# Patient Record
Sex: FEMALE | Race: WHITE | Employment: FULL TIME | URBAN - METROPOLITAN AREA
[De-identification: names, ages, dates, MRNs, and addresses within clinical notes are randomized per-mention and may not be internally consistent; named-entity substitution may affect disease eponyms.]

---

## 2022-08-10 ENCOUNTER — TELEPHONE (OUTPATIENT)
Dept: BEHAVIORAL HEALTH | Facility: CLINIC | Age: 52
End: 2022-08-10

## 2022-08-11 ENCOUNTER — TELEPHONE (OUTPATIENT)
Dept: BEHAVIORAL HEALTH | Facility: CLINIC | Age: 52
End: 2022-08-11

## 2022-08-15 ENCOUNTER — HOSPITAL ENCOUNTER (OUTPATIENT)
Dept: BEHAVIORAL HEALTH | Facility: CLINIC | Age: 52
Discharge: HOME | End: 2022-08-15
Attending: FAMILY MEDICINE | Admitting: FAMILY MEDICINE
Payer: COMMERCIAL

## 2022-08-15 VITALS — WEIGHT: 163 LBS | HEIGHT: 67 IN | BODY MASS INDEX: 25.58 KG/M2

## 2022-08-15 PROCEDURE — H0001 ALCOHOL AND/OR DRUG ASSESS: HCPCS | Mod: GT,95

## 2022-08-15 RX ORDER — GABAPENTIN 300 MG/1
400 CAPSULE ORAL
COMMUNITY
Start: 2022-08-09

## 2022-08-15 ASSESSMENT — PAIN SCALES - GENERAL: PAINLEVEL: NO PAIN (0)

## 2022-08-15 ASSESSMENT — COLUMBIA-SUICIDE SEVERITY RATING SCALE - C-SSRS
6. HAVE YOU EVER DONE ANYTHING, STARTED TO DO ANYTHING, OR PREPARED TO DO ANYTHING TO END YOUR LIFE?: NO
4. HAVE YOU HAD THESE THOUGHTS AND HAD SOME INTENTION OF ACTING ON THEM?: NO
2. HAVE YOU ACTUALLY HAD ANY THOUGHTS OF KILLING YOURSELF IN THE PAST MONTH?: NO
1. IN THE PAST MONTH, HAVE YOU WISHED YOU WERE DEAD OR WISHED YOU COULD GO TO SLEEP AND NOT WAKE UP?: NO
5. HAVE YOU STARTED TO WORK OUT OR WORKED OUT THE DETAILS OF HOW TO KILL YOURSELF? DO YOU INTEND TO CARRY OUT THIS PLAN?: NO
3. HAVE YOU BEEN THINKING ABOUT HOW YOU MIGHT KILL YOURSELF?: NO

## 2022-08-15 NOTE — PROGRESS NOTES
"[facility]  Provider Name:  Alysha Delarosa     Credentials:  [place]    PATIENT'S NAME: Lisandra Olguin  PREFERRED NAME: \"Lisandra\"  PRONOUNS: she/her/hers     MRN: [id number]  : 1970  ADDRESS: [address] [address]  ACCT. NUMBER:  [id number]  DATE OF SERVICE: 8/15/22  START TIME: 8:00 am  END TIME: 9:15 am  INSURANCE: [employer] [place]  PMI:    PREFERRED PHONE: [phone]  May we leave a program related message: Yes  EMERGENCY CONTACT: Jermaine Olguin, spouse, [phone]    SERVICE MODALITY:  Video Visit:      Provider verified identity through the following two step process.  Patient provided:  Patient  and Patient's last 4 digits of SSN    Telemedicine Visit: The patient's condition can be safely assessed and treated via synchronous audio and visual telemedicine encounter.      Reason for Telemedicine Visit: Patient has requested telehealth visit    Originating Site (Patient Location): Patient's home    Distant Site (Provider Location): Provider Remote Setting- Home Office    Consent:  The patient/guardian has verbally consented to: the potential risks and benefits of telemedicine (video visit) versus in person care; bill my insurance or make self-payment for services provided; and responsibility for payment of non-covered services.     Patient would like the video invitation sent by:  My Chart    Mode of Communication:  Video Conference via [employer]ktop[employer]    As the provider I attest to compliance with applicable laws and regulations related to telemedicine.    UNIVERSAL ADULT SUBSTANCE USE DISORDER DIAGNOSTIC ASSESSMENT    Identifying Information:  Patient is a 51 year old,  individual.  The pronoun use throughout this assessment reflects the patient's chosen pronoun.  Patient was referred for an assessment by .  Patient attended the session alone.    Chief Complaint:   The reason for seeking services at this time is: " "\"alcohol use.\"  The problem(s) began 8/1/2018.  Patient has attempted to resolve these concerns in the past through OP programming.  Patient does not appear to be in severe withdrawal, an imminent safety risk to self or others, or requiring immediate medical attention and may proceed with the assessment interview.    Social/Family History:  Patient reported they grew up in [place], [place].  They were raised by biological parents.  Parents were always together.  Patient has 5 full siblings.  Patient is the only female child.  Patient reported that their childhood was \"wonderful. My grandma and grandpa shared the same lawn, it was delightful.\"  Patient described their current relationships with family of origin as \"really close to my parents, we talk on the phone all the time.  I'm not super close to my siblings, but I am close to my brother Marko, he has an alcohol use disorder.  He's never gone to treatment, he just abuses alcohol.\"  Patient [place] with a population of 5.  Patient's graduating class was 27, she is from [place] most [place] part [place].  Horses cows and sheep.      The patient describes their cultural background as  growing up [employer], going to a Scientologist [employer].  Cultural influences and impact on patient's life structure, values, norms, and healthcare: [place]  Contextual influences on patient's health include: Individual Factors Substance Use.  Patient identified their preferred language to be English. Patient reported they does not need the assistance of an  or other support involved in therapy.  Patient reports they are involved in community of madeline activities.  They reports spirituality impacts recovery in the following ways:  \"I would say a lot.  I go to Buddhism 4 times a week, I spend a lot of time in prayer in Buddhism.\"     Patient reported had no significant delays in developmental tasks.   Patient's highest " education level was graduate school. Patient identified the following learning problems: none reported.  Patient reports they are  able to understand written materials.    Patient reported the following relationship history 1 marriage.  Patient's current relationship status is  for 28 years.   Patient identified their sexual orientation as heterosexual.  Patient reported having 2 children.     Patient's current living/housing situation involves staying in own home/apartment.  The immediate members of family and household include Jermaine Olguin, 60, and her two sons and they report that housing is stable. Patient identified partner; parents; friends as part of their support system.  Patient identified the quality of these relationships as stable and meaningful.      Patient reports engaging in the following recreational/leisure activities: hiking, biking, spend time on the lake, go to Mosque.  Patient is currently employed fulltime.  Patient reports their income is obtained through employment; partner.  Patient does not identify finances as a current stressor.      Patient denies substance related arrests or legal issues.  They are not under any current court jurisdiction.     Patient's Strengths and Limitations:  Patient identified the following strengths or resources that will help them succeed in treatment: Mosque / Lutheran, exercise routine, madeline / spirituality, friends / good social support, family support, intelligence, strong social skills and work ethic. Things that may interfere with the patient's success in treatment include: lack of family support and stressful work environment.     Assessments:  The following assessments were completed by patient for this visit:  PHQ2:   PHQ-2 ( 1999 [employer]) 8/14/2022   Q1: Little interest or pleasure in doing things 0   Q2: Feeling down, depressed or hopeless 0   PHQ-2 Score 0   Q1: Little interest or pleasure in doing things Not at all   Q2: Feeling  down, depressed or hopeless Not at all   PHQ-2 Score 0     GAD2:   MARY-2 8/14/2022   Feeling nervous, anxious, or on edge 0   Not being able to stop or control worrying 0   MARY-2 Total Score 0     CAGE-AID:   CAGE-AID Total Score 8/14/2022   Total Score 3   Total Score MyChart 3 (A total score of 2 or greater is considered clinically significant)     PROMIS 10-Global Health (all questions and answers displayed):   PROMIS 10 8/14/2022   In general, would you say your health is: Very good   In general, would you say your quality of life is: Good   In general, how would you rate your physical health? Very good   In general, how would you rate your mental health, including your mood and your ability to think? Excellent   In general, how would you rate your satisfaction with your social activities and relationships? Good   In general, please rate how well you carry out your usual social activities and roles Excellent   To what extent are you able to carry out your everyday physical activities such as walking, climbing stairs, carrying groceries, or moving a chair? Completely   How often have you been bothered by emotional problems such as feeling anxious, depressed or irritable? Rarely   How would you rate your fatigue on average? None   How would you rate your pain on average?   0 = No Pain  to  10 = Worst Imaginable Pain 3   In general, would you say your health is: 4   In general, would you say your quality of life is: 3   In general, how would you rate your physical health? 4   In general, how would you rate your mental health, including your mood and your ability to think? 5   In general, how would you rate your satisfaction with your social activities and relationships? 3   In general, please rate how well you carry out your usual social activities and roles. (This includes activities at home, at work and in your community, and responsibilities as a parent, child, spouse, employee, friend, etc.) 5   To what extent  are you able to carry out your everyday physical activities such as walking, climbing stairs, carrying groceries, or moving a chair? 5   In the past 7 days, how often have you been bothered by emotional problems such as feeling anxious, depressed, or irritable? 2   In the past 7 days, how would you rate your fatigue on average? 1   In the past 7 days, how would you rate your pain on average, where 0 means no pain, and 10 means worst imaginable pain? 3   Global Mental Health Score 15   Global Physical Health Score 18   PROMIS TOTAL - SUBSCORES 33     Walsh Suicide Severity Rating Scale (Lifetime/Recent)  Walsh Suicide Severity Rating (Lifetime/Recent) 8/15/2022   Q1 Wished to be Dead (Past Month) no   Q2 Suicidal Thoughts (Past Month) no   Q3 Suicidal Thought Method no   Q4 Suicidal Intent without Specific Plan no   Q5 Suicide Intent with Specific Plan no   Q6 Suicide Behavior (Lifetime) no   Level of Risk per Screen low risk     GAIN-sliding scale:  When was the last time that you had significant problems... 8/15/2022   with feeling very trapped, lonely, sad, blue, depressed or hopeless about the future? Never   with sleep trouble, such as bad dreams, sleeping restlessly, or falling asleep during the day? Past Month   with feeling very anxious, nervous, tense, scared, panicked or like something bad was going to happen? Never   with becoming very distressed & upset when something reminded you of the past? Never   with thinking about ending your life or committing suicide? Never      When was the last time that you did the following things 2 or more times? 8/15/2022   Lied or conned to get things you wanted or to avoid having to do something? Never   Had a hard time paying attention at school, work or home? Never   Had a hard time listening to instructions at school, work or home? Never   Were a bully or threatened other people? Never   Started physical fights with other people? Never       Personal and Family  Medical History:  Patient does report a family history of mental health concerns.  Patient reports family history includes Autism Spectrum Disorder in her son.      Patient reported the following previous mental health diagnoses: none reported.  Patient reports their primary mental health symptoms include:  insomnia and these do not impact her ability to function.   Patient received mental health services in the past: reports no services.  Psychiatric Hospitalizations: none.  Patient denies a history of civil commitment.  Current mental health services/providers include:  None.    Patient has had a physical exam to rule out medical causes for current symptoms.  Date of last physical exam was within the past year. Client was encouraged to follow up with PCP if symptoms were to develop. The patient has a non-[facility] Primary Care Provider. Their PCP is ALIVIA White through [facility].  Patient reports no current medical and/or dental concerns.  Patient denies any issues with pain. Patient denies pregnancy.  There are not significant appetite / nutritional concerns / weight changes.  Patient does not report a history of an eating disorder.  Patient does not report a history of head injury / trauma / cognitive impairment.      Patient reports current meds as:   Outpatient Medications Marked as Taking for the 8/15/22 encounter (Hospital Encounter) with Alysha Dealrosa LADC   Medication Sig     gabapentin (NEURONTIN) 300 MG capsule 400 mg     melatonin 1 MG TABS tablet        Medication Adherence:  Patient reports taking prescribed medications as prescribed.      Patient Allergies:  No Known Allergies    Medical History:    Past Medical History:   Diagnosis Date     History of blood transfusion     after the delivery of my first child.       Substance Use:  Patient reported the following biological family members or relatives with chemical health issues:  Brother Marko..  Patient has not received  "substance use disorder and/or gambling treatment in the past.  Patient has not ever been to detox.  Patient is currently receiving the following services: CD Treatment at [facility] in [place]. Patient reports no history of support group attendance.      Substance History of use Age of first use Pattern and duration of use (include amounts and frequency) Date of last use     Withdrawal potential Route of administration   Alcohol used in the past   18 Current use: LDU Patient drank 3 shots of vodka mixed with Fresca.    Patient said she would drink 1-2 \"cocktails\" which consisted of 3 shots of vodka mixed with Fresca.  Patient would start drinking at 9:45 and would go to bed at 10:30-11.   Pattern had been going on for \"a long time\" about 4 years at a minimum.    Patient said on her days off she would start drinking at 3pm and would take a break and watch a movie and have 2 of her cocktails. On those days patient would consume up to 5-6 of her cocktails per day.    History of use: Patient said she used to drink wine before bed, and so she switched to liquor because she couldn't see it if she spilled.    Patient said before she had children, she never drank. Patient said she began drinking regularly when she moved to [place] in 2008, and in 2012 it began to be daily drinking of 1-2 glasses of wine. 8/9/2022 Yes oral   Cannabis never used        Amphetamines never used        Cocaine/crack  never used        Hallucinogens never used          Inhalants never used          Heroin never used          Other Opiates never used        Benzodiazepine never used        Barbiturates never used        Over the counter meds never used        Caffeine currently use 18 Current use: Patient said she usually drinks a Diet MT Dew when she gets up and then black tea until 3pm.  Patient said she will drink peppermint tea/kombucha until she goes to sleep. 8/15/22 No oral   Nicotine  never used        other substances not listed " "above:  Identify:  never used            Patient reported the following problems as a result of their substance use: relationship problems, HPSP involvement.   Patient is concerned about substance use. Patient reports her  and youngest son (17) is concerned about their substance use.  Patient reports their recovery goals are \"to have a healthy relationship with alcohol.\" For the past 2-3 years.    Patient reports experiencing the following withdrawal symptoms within the past 12 months: none and the following within the past 30 days: none.   Patients reports urges to use Alcohol.  Patient reports she has used more Alcohol than intended and over a longer period of time than intended. Patient reports she has not had unsuccessful attempts to cut down or control use of Alcohol.  Patient reports longest period of abstinence was 1.75 days and return to use was due to not being able to sleep. Patient reports she has needed to use more Alcohol to achieve the same effect.  Patient does not report diminished effect with use of same amount of Alcohol.     Patient does not report a great deal of time is spent in activities necessary to obtain, use, or recover from Alcohol effects.  Patient does  report important social, occupational, or recreational activities are given up or reduced because of Alcohol use.  Alcohol use is continued despite knowledge of having a persistent or recurrent physical or psychological problem that is likely to have caused or exacerbated by use.  Patient reports the following problem behaviors while under the influence of substances: patient said she just \"acts differently\" and her family did not like how she responds and her  complained that she was sedated.     Patient reports substance use has not ever impacted their ability to function in a school setting. Patient reports substance use has ever impacted their ability to function in a work setting.  Patients demographics and history " impact their recovery in the following ways:  N/A.  Patient reports engaging in the following recreation/leisure activities while using:  Patient said she loves to drink and watch TV.  Patient reports the following people are supportive of recovery: everybody, patient said her  is fine with her drinking wine.  Patient's work partners just want her to not drink while she is on call.    Patient does not have a history of gambling concerns and/or treatment .  Patient does not have other addictive behaviors she is concerned about.      Dimension Scale Ratings:    Dimension 1 - Acute Intoxication/Withdrawal: 0 Client displays full functioning with good ability to tolerate and cope with withdrawal discomfort. No signs or symptoms of intoxication or withdrawal or resolving signs or symptoms.  Dimension 2 - Biomedical: 0 Client displays full functioning with good ability to cope with physical discomfort.  Dimension 3 - Emotional/Behavioral/Cognitive Conditions: 1 Client has impulse control and coping skills. Client presents a mild to moderate risk of harm to self or others or displays symptoms of emotional, behavioral or cognitive problems. Client has a mental health diagnosis and is stable. Client functions adequately in significant life areas.  Dimension 4 - Readiness to Change:  1 Client is motivated with active reinforcement, to explore treatment and strategies for change, but ambivalent about illness or need for change.  Dimension 5 - Relapse/Continued Use/ Continued Problem Potential: 2 (A) Client has minimal recognition and understanding of relapse and recidivism issues and displays moderate vulnerability for further substance use or mental health problems.  Dimension 6 - Recovery Environment:  1 Client has passive social .  Client's family and significant other are interested in and supportive of the client's recovery. The client is currently on a leave of absence from work due to HPSP  involvement, but maintains a structured day.    Significant Losses / Trauma / Abuse / Neglect Issues:   Patient did not serve in the .  There are indications or report of significant loss, trauma, abuse or neglect issues related to: are no indications and client denies any losses, trauma, abuse, or neglect concerns.  Concerns for possible neglect are not present.     Safety Assessment:   Patient denies current homicidal ideation and behaviors.  Patient denies current self-injurious ideation and behaviors.    Patient denied risk behaviors associated with substance use.  Patient reported substance use associated with mental health symptoms.  Patient reports the following current concerns for their personal safety: None.  Patient reports there are not firearms in the house.      History of Safety Concerns:  Patient denied a history of homicidal ideation.     Patient denied a history of personal safety concerns.    Patient denied a history of assaultive behaviors.    Patient denied a history of sexual assault behaviors.     Patient denied a history of risk behaviors associated with substance use.  Patient reported a history of substance use associated with mental health symptoms.  Patient reports the following protective factors: forward or future oriented thinking; dedication to family or friends; safe and stable environment; regular sleep; regular physical activity; sense of belonging; purpose; secure attachment; help seeking behaviors when distressed; adherence with prescribed medication; living with other people; daily obligations; structured day; effective problem solving skills; commitment to well being; sense of meaning; positive social skills; financial stability; strong sense of self worth or esteem; sense of personal control or determination; access to a variety of clinical interventions and pets    Risk Plan:  See Recommendations for Safety and Risk Management Plan    Review of Symptoms per patient  report:  Substance Use:  blackouts, passing out, hangovers, daily use, substance related decrease in work performance, family relationship problems due to substance use and cravings/urges to use     Collateral Contact Summary:   Collateral contacts contributing to this assessment:  The patient's electronic medical records were reviewed at time of assessment.    Lisandra Menchaca Release of Information requests were e-mailed to the [place] [address] [facility] at [email or web address] on 8/15/2022 with requests for the following releases:    Patient's e-mail address: [email or web address]    1.) JOSE - [id number] - Collateral Contact & Emergency Contact   Jermaine Menchaca, spouse  Tel #: [phone]    2.) JOSE - [id number] - Collateral Contact & Emergency Contact  Kamran Menchaca, son  Tel #: [phone]     3.) JOSE - [id number] - Exchange of MH & JOSE Records  [facility] - [address], [address]  [place] [address]  Phone: [phone]  Fax: [phone]    ADDENDUM 8/18/22:  Spoke with HPSP, Sujey, would like patient to attend an abstinence based program.  Advised HPSP we recommend level of care and patient has a right to seek services with their preferred provider.  Patient's OP is in [place], and HPSP is concerned that it might not meet [place] Statutes 245G.    Spoke with patient's , Dr. Menchaca on 8/17/22.  He said that he has been  to the patient since 1994 and he thought she began her struggle with alcohol use around 3 years ago and they had talks about it.  He said that patient did enter treatment and he believes it has been helping her, especially the prescribed neurontin for her insomnia.  Casey said the patient attends 3 different churches and is very active in social circles with a lot of supports.  His goal for the patient is to be chemically free, even from sleep aids by the time she has completed programming. Casey's concern is that patient lacked insight into  "her alcohol use prior to entering treatment and is hoping she can gain some knowledge.  Casey said his daughter struggled with substance use 2.5 years ago and patient can utilize her as a sober resource as well.  The only issue he noticed was that she blew into her breathalyzer after using mouthwash and it was a 0.08.  He plans to purchase alcohol free mouthwash for the patient and remove all alcohol from the home.    Spoke with patient's son, Kamran on 8/18/22.  He is 18 and said he has seen his mother drink to intoxication.  He said she would mostly drink by herself in her bedroom while watching a movie at night, or on Tuesdays which were her days off.  He said he has not seen patient drink anything since her sleeping pills arrived and believes this to be around August 8th or 9th.  Kamran noted that he has seen a change in  His mother she she doesn't \"wake up drunk.\"  Although due to using sleeping medication he said she needs to set an alarm now and has been waking up a half hour later.     If court related records were reviewed, summarize here: None.    Information from collateral contacts supported/largely agreed with information from the client and associated risk ratings.    Information in this assessment was obtained from the medical record and provided by patient who is a good historian.    Patient will have open access to their mental health medical record.    Diagnostic Criteria:   1.)  Substance is often taken in larger amounts or over a longer period than was intended.  Met for:  alcohol.  4.)  Craving, or a strong desire or urge to use the substance.  Met for:  alcohol and caffeine.  5.)  Recurrent use of the substance resulting in a failure to fulfill major role obligations at work, school, or home.  Met for:  alcohol.  6.)  Continued use of the substance despite having persistent or recurrent social or interpersonal problems caused or exacerbated by the effects of its use.  Met for:  alcohol and " caffeine.  9.)  Use of the substance is continued despite knowledge of having a persistent or recurrent physical or psychological problem that is likely to have been cause or exacerbated by the substance.  Met for:  alcohol and caffeine.  10.)  Tolerance:  either a need for markedly increased amounts of the substance to achieve the desired effect or a markedly diminished effect with continued use of the dame amount of the substance.  Met for:  alcohol and caffeine.    As evidenced by self report and criteria, the patient meets the following DSM-5 Diagnoses: (Sustained by DSM-5 Criteria Listed Above)      Alcohol Use Disorder Severe - 303.90 (F10.20)  Other Substance Use Disorder Moderate - 304.90 (F19.20)    Specify if: In early remission:  After full criteria for alcohol/drug use disorder were previously met, none of the criteria for alcohol/drug use disorder have been met for at least 3 months but for less than 12 months (with the exception that Criterion A4,  Craving or a strong desire or urge to use alcohol/drug  may be met).     In sustained remission:   After full criteria for alcohol use disorder were previously met, none of the criteria for alcohol/drug use disorder have been met at any time during a period of 12 months or longer (with the exception that Criterion A4,  Craving or strong desire or urge to use alcohol/drug  may be met).     Specify if:   This additional specifier is used if the individual is in an environment where access to alcohol is restricted.    Mild: Presence of 2-3 symptoms  Moderate: Presence of 4-5 symptoms  Severe: Presence of 6 or more symptoms    Recommendations:     1. Plan for Safety and Risk Management:  Recommended that patient call 911 or go to the local ED should there be a change in any of these risk factors.      Report to child / adult protection services was NA.     2. JOSE Recommendations:      1)  Attend 6 individual psychotherapy sessions that offer a chemical  dependency component to it.  2)  Abstain from all mood-altering chemicals unless prescribed by a licensed provider.   3)  Attend a weekly support group meetings, such as 12 step based (AA/NA), SMART Recovery, Health Realizations, and/or Refuge Recovery meetings.     4)  Continue attending the outpatient programming with your current provider, [facility] and follow the recommendations.  5)  Follow all the recommendations of your treatment/medical providers.  6)  Follow all rules and requirements of the [facility]PSP program along with increased UA's.    The patient does not have a history of opiate use.    3.  Mental Health Referrals:     The patient would benefit from continuing to follow all of the recommendations of her mental health providers.    4. Cultural Concerns:    The patient did not identify having any cultural concerns regarding mental health, physical health, or substance use issues.     5. Recommendations for treatment focus:      Alcohol / Substance Use - See #2. JOSE Referrals above for details on recommendations.  Anxiety - See #3. Mental Health Referrals above for details on recommendations.    6.  Referrals:  TBD, patient has an EAP that she would like to utilize for individual counseling.    Clinical Substantiation:     Met with patient individually on 8/15/22 for comprehensive assessment including summary of assessment and conclusion, assessment risk and appropriate steps taken, documentation to support the diagnosis, rationale for disposition and appropriate level of care recommended and alternative for treatment options.  Patient has been working with [facility] out of [place] on an outpatient basis.  Patient said that she has been using alcohol to help her fall asleep and to relax from the stressors of her job and home life.  Patient has not used alcohol in the past week and said she did not feel any symptoms of withdrawal.  Patient was seeing her partner for primary care, but said she understands  this is not appropriate and has made an appointment with a primary care doctor for 8/16/22.  Patient recognizes that she has used alcohol as a maladaptive coping mechanism to deal with her stress and said it has worked for her and she continued to use daily for the past 4 years and it had never impacted her job before.  Patient also admitted to drinking while she was on call from her office and said she understands that is also a reportable offense.  Patient denied ever driving while under the influence and said she did feel hung over sometimes the next day, but denied it ever impacted her ability to perform at work.  Patient does not have insight into her addiction and has minimized the impact alcohol has had on her life.  Patient would benefit from some programming to help her gain sobriety, knowledge about the disease model of addiction, and develop sober coping skills.    Rational for recommended level of care: The patient lacks a sober living environment, lacks long-term sober maintenance skills, lacks sober coping skills, lacks awareness regarding the disease model of addiction and lacks a sober peer support network.    Placement/Program/Barriers Identified: Patient is working with [facility] and has an EAP to utilize for individual therapy.     The patient reported she is willing to follow the above recommendations.    The patient would like the following family or other support people involved in their treatment:  None at this time.    RONALDO : Quin ID: [id number]    Provider Name/ Credentials:  Alysha Delarosa MA, [facility]  August 15, 2022             Cooperative/Awake/Alert

## 2022-08-15 NOTE — TELEPHONE ENCOUNTER
Spoke with patient and checked them in for their virtual JOSE eval today, 8/15/2022 at 0800. Emergency contact and email verified. Appointment will be done via [employer] video.

## 2022-08-18 NOTE — PATIENT INSTRUCTIONS
Recommendations:     1. Plan for Safety and Risk Management:  Recommended that patient call 911 or go to the local ED should there be a change in any of these risk factors.      Report to child / adult protection services was NA.     2. JOSE Recommendations:      1)  Attend 6 individual psychotherapy sessions that offer a chemical dependency component to it.  2)  Abstain from all mood-altering chemicals unless prescribed by a licensed provider.   3)  Attend a weekly support group meetings, such as 12 step based (AA/NA), SMART Recovery, Health Realizations, and/or Refuge Recovery meetings.     4)  Continue attending the outpatient programming with your current provider, [facility] and follow the recommendations.  5)  Follow all the recommendations of your treatment/medical providers.  6)  Follow all rules and requirements of the HPSP program along with increased UA's.    The patient does not have a history of opiate use.    3.  Mental Health Referrals:     The patient would benefit from continuing to follow all of the recommendations of her mental health providers.    4. Cultural Concerns:    The patient did not identify having any cultural concerns regarding mental health, physical health, or substance use issues.     5. Recommendations for treatment focus:      Alcohol / Substance Use - See #2. JOSE Referrals above for details on recommendations.  Anxiety - See #3. Mental Health Referrals above for details on recommendations.    6.  Referrals:  TBD, patient has an EAP that she would like to utilize for individual counseling.

## 2022-08-18 NOTE — ADDENDUM NOTE
Encounter addended by: Alysha Delarosa LADC on: 8/18/2022 3:39 PM   Actions taken: Clinical Note Signed

## 2022-10-03 ENCOUNTER — HEALTH MAINTENANCE LETTER (OUTPATIENT)
Age: 52
End: 2022-10-03

## 2023-10-22 ENCOUNTER — HEALTH MAINTENANCE LETTER (OUTPATIENT)
Age: 53
End: 2023-10-22

## 2024-12-15 ENCOUNTER — HEALTH MAINTENANCE LETTER (OUTPATIENT)
Age: 54
End: 2024-12-15